# Patient Record
(demographics unavailable — no encounter records)

---

## 2024-11-13 NOTE — CONSULT LETTER
[Dear  ___] : Dear  [unfilled], [Courtesy Letter:] : I had the pleasure of seeing your patient, [unfilled], in my office today. [Sincerely,] : Sincerely, [DroBb  ___] : Dr. EDEN

## 2024-11-13 NOTE — CONSULT LETTER
[Dear  ___] : Dear  [unfilled], [Courtesy Letter:] : I had the pleasure of seeing your patient, [unfilled], in my office today. [Sincerely,] : Sincerely, [DrBob  ___] : Dr. EDEN

## 2024-11-15 NOTE — PHYSICAL EXAM
[Sclera nonicteric] : sclera nonicteric [Supple] : supple [No Supraclavicular Adenopathy] : no supraclavicular adenopathy [No Cervical Adenopathy] : no cervical adenopathy [No Thyromegaly] : no thyromegaly [Clear to Auscultation Bilat] : clear to auscultation bilaterally [Examined in the supine and seated position] : examined in the supine and seated position [No dominant masses] : no dominant masses in right breast  [No dominant masses] : no dominant masses left breast [No Axillary Lymphadenopathy] : no left axillary lymphadenopathy [Soft] : abdomen soft [Not Tender] : non-tender [de-identified] : Implant reconstruction. Circumareolar scar with lateral extension.\par   [de-identified] : Implant reconstruction.  Circumareolar scar with lateral extension. [de-identified] : Lower abdominal scar. [de-identified] : Left arm with contracture. Left leg in brace.

## 2024-11-15 NOTE — HISTORY OF PRESENT ILLNESS
[FreeTextEntry1] : The patient had bilateral nipple sparing mastectomies with implant reconstructions in 5/2013 for right breast DCIS.  She had lost a part of the superior areola.  She underwent a revision of the reconstruction with a change of the implants for a different shape, excision of excess skin and excision of areas of fat necrosis in January 2014.  She had tattooing done after also.    She has had a small focal area of thickening on the right areolar skin in 2016 that seemed to become more prominent and a biopsy was performed in 2/2016 with benign results.    She then underwent revision of the reconstruction changing the implants to a prepectoral location in 11/2017.  She suffered a stroke on POD#4 with left hemiparesis. The etiology is not clear.    She is here with her sister (whose daughter now works in the OR).

## 2024-11-15 NOTE — DATA REVIEWED
[FreeTextEntry1] : Bilateral breast MRI 12/12/2022:  No MRI evidence of malignancy.  Intact implants.

## 2024-11-15 NOTE — PHYSICAL EXAM
[Sclera nonicteric] : sclera nonicteric [Supple] : supple [No Supraclavicular Adenopathy] : no supraclavicular adenopathy [No Cervical Adenopathy] : no cervical adenopathy [No Thyromegaly] : no thyromegaly [Clear to Auscultation Bilat] : clear to auscultation bilaterally [Examined in the supine and seated position] : examined in the supine and seated position [No dominant masses] : no dominant masses in right breast  [No dominant masses] : no dominant masses left breast [No Axillary Lymphadenopathy] : no left axillary lymphadenopathy [Soft] : abdomen soft [Not Tender] : non-tender [de-identified] : Implant reconstruction. Circumareolar scar with lateral extension.\par   [de-identified] : Implant reconstruction.  Circumareolar scar with lateral extension. [de-identified] : Lower abdominal scar. [de-identified] : Left arm with contracture. Left leg in brace.

## 2024-11-15 NOTE — PAST MEDICAL HISTORY
[Menarche Age ____] : age at menarche was [unfilled] [Menopause Age____] : age at menopause was [unfilled] [Total Preg ___] : G[unfilled] [Live Births ___] : P[unfilled]  [Age At Live Birth ___] : Age at live birth: [unfilled] [FreeTextEntry8] : each a few months

## 2025-03-05 NOTE — HISTORY OF PRESENT ILLNESS
[FreeTextEntry1] : 62  F w multiple sclerosis, MCA CVA in 10/2017 s/p ILR, depression, breast cancer s/p mastectomy bilateral, seizures presents for a followup visit.    Since her last visit, she has been stressed since her  passed (took his life). She is adjusting to a new way of life. She has not had any palpitations.  She denies any chest pain, PND, orthopnea, lower extremity edema, near syncope, syncope, stroke like symptoms.  Medication reconciliation performed. She is compliant with her medications. Seizures have been stable,

## 2025-03-05 NOTE — DISCUSSION/SUMMARY
[FreeTextEntry1] : 61  F w multiple sclerosis, MCA CVA in 10/2017 s/p ILR-now at [facility], depression, breast cancer s/p mastectomy bilateral, seizures to PV now presents for a followup visit.   Tri is doing relatively well. She denies any anginal symptoms. Clinically she is euvolemic on exam. Her EKG did not reveal any significant ischemic changes.   She will continue ASA. She will continue with statin therapy to achieve maintain goal LDL<100 or ideally <70.  Palpitations are sporadic. Her ILR was negative. She will continue Toprol 100mg Q12.   Exercise and diet counseling was performed in order to reduce her future cardiovascular risk.  She will followup with me in 6 months or sooner if necessary.  [EKG obtained to assist in diagnosis and management of assessed problem(s)] : EKG obtained to assist in diagnosis and management of assessed problem(s)

## 2025-03-05 NOTE — PHYSICAL EXAM
[Well Developed] : well developed [Well Nourished] : well nourished [No Acute Distress] : no acute distress [Normal Conjunctiva] : normal conjunctiva [Normal Venous Pressure] : normal venous pressure [No Carotid Bruit] : no carotid bruit [Normal S1, S2] : normal S1, S2 [Rhythm Regular] : regular [Normal S1] : normal S1 [Normal S2] : normal S2 [No Murmur] : no murmurs heard [No Pitting Edema] : no pitting edema present [Right Carotid Bruit] : no bruit heard over the right carotid [Left Carotid Bruit] : no bruit heard over the left carotid [No Abnormalities] : the abdominal aorta was not enlarged and no bruit was heard [Clear Lung Fields] : clear lung fields [Good Air Entry] : good air entry [No Respiratory Distress] : no respiratory distress  [Soft] : abdomen soft [Non Tender] : non-tender [No Masses/organomegaly] : no masses/organomegaly [Normal Bowel Sounds] : normal bowel sounds [No Edema] : no edema [No Cyanosis] : no cyanosis [No Rash] : no rash [No Skin Lesions] : no skin lesions [Moves all extremities] : moves all extremities [No Focal Deficits] : no focal deficits [Normal Speech] : normal speech [Alert and Oriented] : alert and oriented [Normal memory] : normal memory [de-identified] : in wheelchair [de-identified] : left hemiparesis

## 2025-03-05 NOTE — END OF VISIT
[FreeTextEntry3] : I saw and evaluated the patient and discussed the care with the NP provider above on 03/12/2024 . I agree with the findings and plan as documented in the note above. She denies any anginal symptoms. Clinically she is euvolemic on exam. cont meds. gainign weight. Exercise and diet counseling was performed in order to reduce her future cardiovascular risk.

## 2025-03-05 NOTE — CARDIOLOGY SUMMARY
[de-identified] : SR  [de-identified] : 2017 ILR negative  [de-identified] : nuclear stress test unrevealing for ischemia on 9/2018.  [de-identified] : 9/2020 that showed normal systolic LV function without any significant other findings, including no significant valvular disease.

## 2025-07-28 NOTE — PHYSICAL EXAM
[FreeTextEntry1] : General: Well-developed female in no apparent distress.  Patient is awake, alert and oriented x 3.  Cooperative.  Speech fluent HEENT: Normocephalic, atraumatic.  MMM Lungs: Clear to auscultation. Cardiac: Regular rate and rhythm. Abdomen: Bowel sounds present, no distended. Extremities: No pedal edema.  1 cm callus noted over the left lateral malleolus.  No erythema or skin breakdown noted.  Motor: Right upper extremity/right lower extremity: Tone normal, active range of motion within functional limits with 5/5 motor power throughout.  Thumb to digit opposition intact.  Left upper extremity: Synergistic shoulder flexion/elbow flexion patterning <3/5mp.  Wrist/hand 0/5 MP. Tone: Shoulder adductors MAS = 2, passive abduction to 90 degrees Elbow flexors MAS = 2+ Elbow extensors MAS = 3 Pronators MAS = 1, passive supination to 90 degrees Wrist flexors MAS = 1 to 1+, passive extension to 80 degrees FDS MAS = 2 with the wrist flexed; MAS = 3+ to 4 with the wrist extended FDP MAS = 2 with the wrist flexed; MAS = 3 to 3+ with the wrist extended MCP flexors MAS = 1 FPL MAS = 0 with the wrist flexed; MAS = 2 with the wrist extended  Left lower extremity: Hip flexion 3 -/5 MP, knee extension 4+/5 MP.  Ankle dorsiflexion/inversion/eversion 0/5 MP.  Ankle plantarflexion 3/5 MP. Passive ankle dorsiflexion to neutral with the knee flexed and extended Tone: Ankle plantar flexors MAS = 2 Ankle inverters MAS = 2  Sensory: Diminished light touch and pinprick in the left upper and left lower extremity Muscle stretch reflexes: +3 right KJ, +3/4 left KJ  Functional status: Sit to stand transfer independent.  Patient ambulated with a narrow-base quad cane and left semisolid AFO with heel strike present, knee locked in extension throughout swing and stance phase.  Improve clearance of the left lower extremity with a 3/8" heel lift on the right.

## 2025-07-28 NOTE — HISTORY OF PRESENT ILLNESS
[FreeTextEntry1] : Patient is a 64-year-old right-hand-dominant female PMHx multiple sclerosis (diagnosed 2001), status post CVA (2017)/left spastic hemiparesis, seizure who presents today for a brace and gait evaluation.  Patient reports receiving a hinged AFO approximately 5 years ago but was unable to tolerate it and is currently using her solid AFO she obtained in 2017.  Main complaint with the brace is that it is tight at the ankle and she has been getting a callus at the left lateral malleolus.  No skin breakdown.  Episodic discomfort with the brace.  Patient also reports that it can be difficult to put the brace on especially getting her heel all the way into the brace.  Patient has increased tone in the left upper extremity causing clenching of her left hand.  Difficulties with stretching, no hygiene issue.  Patient could only tolerate her resting hand splint approximately 1 to 2 hours.  No falls reported.  Patient will report approximately 2 near falls per week due to catching of the left foot.  Functionally the patient ambulates independently with a narrow-base quad cane and left AFO.  Patient does have a power wheelchair.  Patient is independent in toileting, independent transfers, independent dressing.  Patient is receiving physical therapy 1 time per week.  Patient's weight has been stable.

## 2025-07-28 NOTE — REVIEW OF SYSTEMS
[Patient Intake Form Reviewed] : Patient intake form was reviewed [Muscle Weakness] : muscle weakness [Difficulty Walking] : difficulty walking [Negative] : Gastrointestinal [Fever] : no fever [Recent Change In Weight] : ~T no recent weight change [Skin Wound] : no skin wound

## 2025-07-28 NOTE — ASSESSMENT
[FreeTextEntry1] : Patient is a 64-year-old RHD female PMHx multiple sclerosis (diagnosed 2001), s/p CVA (2017)/left spastic hemiparesis with gait impairments noted above.  Patient's current left semisolid AFO is approximately 7 to 8 years old, tight at the ankle causing callus formation and requires replacement.  Patient requires a custom molded AFO to prevent foot drop, control equinovarus posturing and for safe ambulation.  Patient will require the AFO on a permanent basis.  Prescription provided for a left custom molded SAFO with varus control, instep strap acting as a medial pull strap, full footplate and flexible toe.  Website provided for patient to obtain a right heel lift- 3/8" to improve clearance of her left lower extremity.  Patient has excessive tone in the long finger flexors which is causing discomfort with stretch and limiting ability to tolerate her resting hand splint.  In addition excessive tone in her ankle plantar flexors is making it difficult for her to get her heel fully into the brace.  Will seek authorization for Botox injection to address these issues.  Proposed Botox injection protocol:  Left FDS---------------------------- 90 units Left FDP---------------------------- 70 units Left medial gastrocnemius----- 80 units Left lateral gastrocnemius------ 80 units Left soleus-------------------------- 60 units  Total---------------------------------- 380 units  I spent a total of 45 minutes on the date of the encounter evaluating treating the patient including a discussion of treatment options.  My time excludes teaching and/or procedures.